# Patient Record
(demographics unavailable — no encounter records)

---

## 2025-07-08 NOTE — HISTORY OF PRESENT ILLNESS
[FreeTextEntry1] : Mr. César Fuentes is a 76-year-old gentleman who was referred for evaluation in early 2020.  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  A definitive course of external beam therapy was recommended in my initial consultation dated 01/16/2020. On 03/12/2020 the patient underwent simulation in our [place] office.  He subsequently went on to complete a course of accelerated hypo fractionated RT totaling 7200 cGy.  Details regarding his treatment are specified in my summary note dated 05/01/2020.    7/17/25 - pt present for scheduled appointment.

## 2025-07-16 NOTE — LETTER GREETING
WDL [Dear  ___] : Dear  [unfilled], [Follow-Up] : Your patient, [unfilled] was seen in my office today for follow-up [Please see my note below.] : Please see my note below.

## 2025-07-17 NOTE — HISTORY OF PRESENT ILLNESS
[FreeTextEntry1] : Mr. César Fuentes is a 76-year-old gentleman who was referred for evaluation in early 2020.  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  A definitive course of external beam therapy was recommended in my initial consultation dated 01/16/2020.  The patient had planned travel in the upcoming weeks and did not return to our office until March of that year.  At that juncture the patient had completed standard pretreatment preparatory procedures.  These included both placement of fiducial markers and introduction of a rectal protecting Hydrogel.  On 03/12/2020 the patient underwent simulation in our [place] office.  He subsequently went on to complete a course of accelerated hypofractionated RT totaling 7200cGy.  Details regarding his treatment are specified in my summary note dated 05/01/2020.  Overall, the treatment was reasonably tolerated; although, the patient did experience some acute lower urinary tract symptoms. These have gradually resolved over the past few years.  I have seen Mr. Fuentes at regular intervals since completion of therapy.  The last of these visits occurred 1 year ago.  As part of this patient's ongoing objective assessment to treatment interval PSA testing was performed.  In December of 2023 the patient's PSA was essentially unchanged measuring 0.2.  His PSA from June 2024, we decreased to 0.1.  His most recent PSA is dated 07/14/25.  His PSA from that date remains low (0.09).    As part of this patient's intake, today, he completed an AUA symptom scoring sheet.  The patient has AUA symptom score is essentially unchanged, currently measuring 6.  At last year's visit it measured 8.  The patient no longer requires tamsulosin.  This was discontinued more than a year ago.  I spent some time discussing sexual health issues with the patient.  In the immediate posttreatment phase he felt that there was benefit gleaned from using low-dose tadalafil (5 mg).  Within a short period of time after completion of therapy the patient noted relatively normal erectile function and discontinued use of this medication.  He states that at present he would like a renewal prescription for this medication noting that he continues to experience enhanced erectile function with its regular use.  A renewal prescription at 5 mg dosing was provided to the patient.  With regard to his digestive system, the patient reports an episode of diverticulitis with abscess formation, last year.  This condition has apparently resolved without sequela.  I reminded the patient regarding the importance of ongoing screening surveillance.    7/17/25 - pt present for scheduled appointment. PSA 0.09 as of 7/14/25. no urinary urgency or frequency. no pain or any discomfort noted. Normal bowel movement.

## 2025-07-17 NOTE — PHYSICAL EXAM
[Normal Sphincter Tone] : normal sphincter tone [No Prostate Nodules] : no prostate nodules [Normal Prostate Exam] : The prostate exam was normal [Normal] : oriented to person, place and time, the affect was normal, the mood was normal and not anxious [External Hemorrhoid] : no external hemorrhoids [Internal Hemorrhoid] : no internal hemorrhoids [Blood on examination glove] : no blood on examination glove

## 2025-07-17 NOTE — REVIEW OF SYSTEMS
[IPSS Score (0-40): ___] : IPSS score: [unfilled] [EPIC-CP Score (0-60): ___] : EPIC-CP score: [unfilled] [Negative] : Psychiatric

## 2025-07-17 NOTE — HISTORY OF PRESENT ILLNESS
[FreeTextEntry1] : Mr. César Fuentes is a 76-year-old gentleman who was referred for evaluation in early 2020.  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  A definitive course of external beam therapy was recommended in my initial consultation dated 01/16/2020.  The patient had planned travel in the upcoming weeks and did not return to our office until March of that year.  At that juncture the patient had completed standard pretreatment preparatory procedures.  These included both placement of fiducial markers and introduction of a rectal protecting Hydrogel.  On 03/12/2020 the patient underwent simulation in our [place] office.  He subsequently went on to complete a course of accelerated hypofractionated RT totaling 7200cGy.  Details regarding his treatment are specified in my summary note dated 05/01/2020.  Overall, the treatment was reasonably tolerated; although, the patient did experience some acute lower urinary tract symptoms. These have gradually resolved over the past few years.  I have seen Mr. Fuentes at regular intervals since completion of therapy.  The last of these visits occurred 1 year ago.  As part of this patient's ongoing objective assessment to treatment interval PSA testing was performed.  In December of 2023 the patient's PSA was essentially unchanged measuring 0.2.  His PSA from June 2024, we decreased to 0.1.  His most recent PSA is dated 07/14/25.  His PSA from that date remains low (0.09).    As part of this patient's intake, today, he completed an AUA symptom scoring sheet.  The patient has AUA symptom score is essentially unchanged, currently measuring 6.  At last year's visit it measured 8.  The patient no longer requires tamsulosin.  This was discontinued more than a year ago.  I spent some time discussing sexual health issues with the patient.  In the immediate posttreatment phase he felt that there was benefit gleaned from using low-dose tadalafil (5 mg).  Within a short period of time after completion of therapy the patient noted relatively normal erectile function and discontinued use of this medication.  He states that at present he would like a renewal prescription for this medication noting that he continues to experience enhanced erectile function with its regular use.  A renewal prescription at 5 mg dosing was provided to the patient.  With regard to his digestive system, the patient reports an episode of diverticulitis with abscess formation, last year.  This condition has apparently resolved without sequela.  I reminded the patient regarding the importance of ongoing screening surveillance.    7/17/25 - pt present for scheduled appointment. PSA 0.09 as of 7/14/25. no urinary urgency or frequency. no pain or any discomfort noted. Normal bowel movement.

## 2025-07-17 NOTE — DISEASE MANAGEMENT
[1] : T1 [c] : c [0] : N0 [0-10] : 0 -10 ng/mL [Biopsy with Fusion] : Patient had a biopsy with fusion on [7(4+3)] : Fusion Biopsy [place] Score: 7(4+3) [] : Patient had a Prostate MRI [2] : 2 [Radiation Therapy] : Radiation Therapy [Treatment with radiation therapy] : Treatment with radiation therapy [EBRT] : EBRT [BiopsyDate] : 01/19 [TotalPositiveCores] : 3 [RadiationCompletedDate] : 05/20 [EBRTDose] : 7199 [EBRTFractions] : 32